# Patient Record
Sex: FEMALE | Race: WHITE
[De-identification: names, ages, dates, MRNs, and addresses within clinical notes are randomized per-mention and may not be internally consistent; named-entity substitution may affect disease eponyms.]

---

## 2018-04-26 ENCOUNTER — HOSPITAL ENCOUNTER (OUTPATIENT)
Dept: HOSPITAL 35 - RAD | Age: 83
End: 2018-04-26
Attending: FAMILY MEDICINE
Payer: COMMERCIAL

## 2018-04-26 DIAGNOSIS — Z12.31: Primary | ICD-10-CM

## 2020-09-30 NOTE — NUR
PT ARRIVED TO FLOOR PER WC FROM ED AT 1545 IN STABLE CONDITION.ADMISSION HX,
ASSESSMENT AND CARE PLAN COMPLETED.DR CAMPOS NOTIFIED ABOUT PT ARRIVAL TO
FLOOR AND ORDER NOTED.PT UP TO BSC WITH ASSIST.CLEAR LIQ TRAY GIVEN AT DINNER.
REPORT OFF TO NOC RN.

## 2020-09-30 NOTE — EKG
[facility]
[place]   [address]                     ELECTROCARDIOGRAM REPORT      
_______________________________________________________________________________
 
Name:       SHAHAB GARCIA                 Room #:         170-8       ADM [facility] 
M.R.#:      [id number]                       Account #:      [id number]  
Admission:  20    Attend Phys:    Robert Soliman MD  
Discharge:              Date of Birth:  [birth year]  
                                                          Report #: [id number]
                                                                    [id number]
_______________________________________________________________________________
THIS REPORT FOR:  
 
cc:  Robert Soliman MD, Neal A. MD Santiago, Patrick MD FACC                                         ~
THIS REPORT FOR:   //name//                          
 
                         [facility] ED
                                       
Test Date:    2020               Test Time:    12:34:47
Pat Name:     SHAHAB GARCIA              Department:   
Patient ID:   [id number]            Room:         [facility]
Gender:       F                        Technician:   ORLY
:          1933               Requested By: Debbie Love
Order Number: [id number] MD:   Mike Coon
                                 Measurements
Intervals                              Axis          
Rate:         67                       P:            44
WY:           211                      QRS:          -53
QRSD:         125                      T:            43
QT:           428                                    
QTc:          452                                    
                           Interpretive Statements
Sinus rhythm
RBBB and LAFB
No previous ECG available for comparison
Electronically Signed On 2020 15:19:12 CDT by Mike Coon
[email or web address]
 
 
 
 
 
 
 
 
 
 
 
 
 
 
 
 
  <ELECTRONICALLY SIGNED>
   By: Mike Coon MD, FACC    
  20     1519
D: 20 1234                           _____________________________________
T: 20 1234                           Mike Coon MD, [facility]      /EPI

## 2020-10-01 NOTE — NUR
Assumed pt care at 1900. Pt A/OX4,VSS though with low grade temp/SOA
 contacted and orders obtained for APAP/CXR/Blood cx. Xray results
called to MD and N.O for Levaquin given and implemented. Pt's IV infiltrated
twice and a 24 gauge started on LFA,IVF infusing w/o problems at this time. Pt
c/o not being able to sleep well despite sleep aid being given stating she
wants to go home today regardless of dtr/MD decision stating she shouldn't
have been admitted she's fine 1:1 reassuarance given to pt with little
success. Reports feeling hungry this am,on a clear liquid diet will relay to
day nurse for advancing diet. No c/o N/V throughout the night. Up with AX1 to
BSC. Fall precautions in place,calls approp for help,will continue to monitor
pt.

## 2020-10-01 NOTE — NUR
Assumed pt care at 7am.Assessment completed.vss.Pt was very anxious and
hungry wanted breakfast.Dr Soliman rounded on pt and order noted.Covid 19
test done.Lab called and was told that the result will be out by 1500.Due to
covid r/o protocol,pt transfered to 3west per bed at am accompanied by this
rn.Dr Soliman and pt family notified.

## 2020-10-01 NOTE — NUR
ASSUMED PATIENT CARE THIS AM AT APPROXIMATELY 1000. PATIENT ARRIVED TO UNIT
FROM 4W FOR R/O COVID. PATIENT EDUCATED ON ISOLATION PRECAUTIONS AND
INDICATION FOR MOVING ROOMS. ALL SAFETY PRECAUTIONS IN PLACE. CALL LIGHT
WITHIN REACH
EDUCATED PATIENT ON USE OF CALL LIGHT FOR ASSISTANCE OUT OF BED. NO ACUTE
DISTRESS NOTED. ASSESSMENT AND MEDS AS CHARTED.

## 2020-10-02 NOTE — NUR
0230 PT CALLED OUT TO USE BSC.  PT WAS LOOKING DISTRESSED AND HAVING HARD TIME
BREATHING.  PLACED 02 SENSOR AND PT WAS IN LOW 80'S. ORDERED ABG'S.  GRABBED
NC AND PLACED PT ON 4L.  02 UP TO 86-88%.  PLACED PT ON REBREATHER TO INCREASE
02 SATURATION.  PT 02 ON REBREATHER WAS 98%.  ALSO PLACED PT ON TELE TO SEE IF
ANY UNDERLYING HEART RHYTHMS.  TELE SHOWED ST.  CALLED  AND GAVE UPDATE ON
PT ABG'S, 02 NEEDS AND ANXIETY.  RECEIVED ORDERS AND TO VERIFY LABS AND
CHEST XRAY FOR AM.  PT GIVEN 0.5MG XANAX TO HELP WITH ANXIETY.  PT TOLERATED
XANAX WELL AND SLEEPING.  02 TITRATED TO 2L NC AND AT 98% AT 0430.  2ND COVID
SWAB CAME BACK POSITIVE. CALLED HS WITH RESULT AND PLACED CONSULT FOR ID.

## 2020-10-02 NOTE — NUR
PT CARE ASSUME AT 0700, PT ALERT AND ORIENTED X4, FORGETFUL ATIMES. PT IS ON
2L OF OXYGEN. SEEMS TO HAVE OW APPETITE, ENCOURAGE TO EAT BUT REFUSES TO EAT.
PT IS UP WITH 1 ASSIST TO THE BEDSIDE COMMODE. FALL PRECAUTION IN PLACE. WILL
CONTINUE TO MONITOR.

## 2020-10-02 NOTE — NUR
INITIAL ASSESSMENT:
Received consult. SW reviewed chart and spoke with nursing and attending
physician. Pt was admitted from home due to weakness/dehydration. Pt
transferred to 3W from 4W yesterday morning and placed in Enhanced Isolation.
Pt did have positive COVID test. Pt is on 2L of O2 and on IV abx/IV steroids.
Pt did require 15L with NRB mask overnight. Pt is a DNR. PT/OT ordered to
evaluate pt for discharge needs. ID consulted. No weekend discharge planned.
BINTA placed call to pt's room. No answer. BINTA spoke with pt's dtr, Milagros, via
phone. Introduced role of SW. Pt is normally alert/orientated x 4. Pt lives at
home with Milagros and her . 5 steps to enter the home. 10 steps up to
the second level where pt's bedroom is located. Pt does not use any DME and is
able to navigate the stairs. No use of O2 prior to admission. Pt's PCP is Dr. Soliman. No hx of HH services or post-acute placement. Pt's dtr is concerned
about pt returning home and need to isolate in the home. BINTA discussed options
for discharge: SNF placement v. Home with HH. Pt's dtr states HH would be
preferred. SW answered all questions from pt's dtr. SW is following to assist
as needed with discharge planning.

## 2020-10-03 NOTE — NUR
ASSUMED PATIENT CARE THIS AM AT APPROXIMATELY 0700. PATIENT AWAKE ALERT
ORIENTED X3. INTERMITTENT CONFUSION ON DATE/TIME. PATIENT O2 SAT AT 90% ON
4LNC, INCREASED O2 TO 5LNC AND O2 SAT INCREASED TO 92%. WILL CTM. PATIENT
DENIES ANY SOB AT THIS TIME. NO COMPLAINTS OF PAIN. STATES THAT SHE WOULD LIKE
TO AMBULATE IN ROOM BY HERSELF BUT EDUCATED ON FALL PRECAUTIONS WITH MX LINES.
ENCOURAGED OOB TO CHAIR WITH ASSISTANCE ONLY.

## 2020-10-03 NOTE — NUR
1 st dose of Remdesivir given and convelscent plasma started at 0155. spoke to
pts dtr for consent to administer te plasma. also talked with pt/ educated on
the need and documents information provided. she has poor vision, helped her
read through the education.

## 2020-10-04 NOTE — NUR
ASSUMED PATIENT CARE THIS AM AT APPROXIMATELY 0700. PATIENT AWAKE ALERT
ORIENTED X3. DISORIENTED TO TIME. PATIENT FORGETFUL AND REPEATS SAME
QUESTIONS. REORIENTED PATIEN PRN THROUGHOUT SHIFT. ASSISTED PATIENT TO BSC AND
BEDSIDE CHAIR WITH 1 PERSON ASSIST, TOLERATED WELL DENIES ANY DIZZINESS/ SOB
WITH TRANSFERS. ENCOUARAGED PATIEN TO STAY IN CHAIR FOR MAJORITY OF THE DAY.
PATIENT O2 SAT AT 90% ON 4LNC. TOLERATING MEDICATIONS WELL. BED ALARM AND
CHAIR ALARM IN PLACE. EDUCATED PATIENT ON FALL PRECAUTIONS.

## 2020-10-04 NOTE — NUR
CONTINUES ON THE REMDESIVIR, SHE STATD THAT SHE IS FEELING BETTER AND LESS
WEAK. CONTINUES ON IV FLUIDS. CALLS APPROP FOR ASSIST OUT FOR BSC.
REPEATEDLY ASKING WHEN SHE WILL GET TO GO HOME. FORGETFUL ABOUT ANSWER GIVEN.

## 2020-10-05 NOTE — NUR
Pt. on 5L/NC at beginning of shift with O2 sat in the low 90's.Assisted
to use commode the desat in the mid 80's. O2 titrated up to 7L/HF to keep O2
sat >90%. She does get short of breath with exertion. RT notified and gave her
scheduled breathing tx. RT titrated O2 back down to 6L after tx. She requested
for anxiety med and xanax given. She slept fair during the night. Cont. on
enhanced precaution ,afebrile. Bed alarm on for safety.

## 2020-10-05 NOTE — NUR
BINTA reviewed chart and spoke with nursing. Pt remains in Enhanced Isolation due
to COVID-19. Pt had convalescent plasma and is completing course of
Remdesivir. Pt is afebrile and now requiring 15L of O2. Pt is on IV lasix and
IV steroids. BINTA spoke with pt's dtr, Milagros, via phone. Milagros asked
about pt being put on hospice in the hospital, should her condition continue
to decline. Pt's dtr to coordinate with pt's nurse another Face Time visit, as
visitors are not allowed on 3W. BINTA updated pt's nurse. BINTA is following to
assist as needed with discharge planning.

## 2020-10-05 NOTE — NUR
ASSUMED PATIENT CARE THIS AM. PATIENT IS AWAKE AND ALERT, CONFUSED AND
FORGETFUL. PATIENT VERY ANXIOUS THIS AM, REPEATEDLY ASKING TO GET OUT OF BED
TO BSC, O2 SAT DROPPED TO 80% ON 7LNC THIS AM WHEN GETTING UP TO BSC, IT TOOK
ABOUT 5 MINUTES FOR PATIENT O2 TO RECOVER TO 88%. PATIENT O2 INCREASED TO
HIGHFLOR NC AT 9L. O2 SAT 91%. ENCOURAGED PATIENT TO USE BEDSIDE COMMODE
AND O2 SAT DROPPED WHEN USING BEDSIDE COMMODE AS WELL TO 80%, PLACED PATIENT
ON NRB AT 15L AND ORDERS OBTAINED FROM DR. CAMPOS FOR SEALS PLACEMENT, CHEST
XRAY, LASIX IV, ABG. O2 SAT INCREASED TO 95% ON 15LNRB. RESTING IN BED.
CALLED PATIENT DAUGHTER TO MAKE AWARE OF CHANGE IN
PATIENT STATUS AND WHEN PATIENT WAS SETTLED PATIENT WAS ABLE TO SPEAK WITH
DAUGHTER VIA FACETIME VIDEO CHAT. ALL SAFETY MEASURES IN PLACE. CALLED DR CAMPOS TO MAKE AWARE OF CHEST XRAY AND ABG RESULTS. AWAITING NEW ORDERS

## 2020-10-06 NOTE — NUR
PT WAS VERY ANXIOUS AT BEGINNING OF SHIFT, CALLING OUT FREQUENTLY AND MAKING
CONFUSED STATEMENTS, SAYING SHE WANTS TO GO HOME.  PT'S DTR JUSTEN CALLED
UNIT; UPDATE PROVIDED. PT BECOMES SOA WITH ANY EXERTION AND O2 SATS DROP INTO
THE 80S. RT PLACED PT ON OPTIFLOW 50L, 75% FIO2. PT WAS GIVEN BENADRYL AND
TYLENOL AT BEDTIME TO HELP WITH SLEEP AND DISCOMFORT. PT HAS SINCE BEEN
SLEEPING WELL DURING THE NIGHT.  RESPIRATIONS EVEN AND UNLABORED. PT HAS
MAINTAINED SPO2 > 92% WHILE ON OPTIFLOW. FALL PRECAUTIONS IN PLACE. NOT
PROGRESSING WELL TOWARD POC GOALS.

## 2020-10-06 NOTE — NUR
BINTA reviewed chart and spoke with nursing and attending physician. Pt remains
in Enhanced Isolation due to COVID-19. Pt is febrile and requiring optiflow
O2. Pt is on IV abx and IV steroids. Pt to complete course of Remdesivir
today. Per attending, pt's family would like to continue course of treatment
for the next couple of days to see if pt's condition improves. If not, pt
would be agreeable with comfort care/hospice. Pt's family has been able to
see/communicate with pt via Face Time. BINTA is following to assist as needed
with discharge planning.

## 2020-10-06 NOTE — NUR
PT VERY ANXIOUS...RESPONDS WELL TO ATIVAN PRN,...SHE DID FACETIME HER DAUGHTER
TODAY WHICH DID CALM HER DOWN...

## 2020-10-07 NOTE — NUR
PT KEEPS REQUESTING TO GO HOME...VEWRY CONCERNED ABOUT SEEING HER FAMILY...SHE
FACETIMED FAMILY X 3 TODAY... Declined

## 2020-10-07 NOTE — NUR
SW reviewed chart and spoke with nursing and attending physician. Pt  remains
in Enhanced Isolation due to COVID-19. Pt is afebrile and and requiring
Optiflow. Pt is on IV Lasix, IV steroids and IV abx. Pt has completed course
of Remdesivir. Physical therapy was able to work with therapy yesterday.  BINTA
is following to assist as needed with discharge planning.

## 2020-10-07 NOTE — NUR
ASSUMED PT CARE AROUND 1930. VSS. REMAINS TO BE ON HFC.MAINTAINED BY RT MOST
OF THE TIMES AN ON CONT PULSE OX MONITORING. NO S/S ACUTE DISTRESS NOTED OR
REPORTED AT THIS TIME. CARE TRANSFERRED TO ANOTHER RN AT THIS TIME

## 2020-10-08 NOTE — NUR
progress
 
pt alert denies pain except for throat pain ice chips provided and tylenol
given with effect pt slept most of shift up to bsc for small stool. tran
intact draining clear yellow urine remians on optiflo at 53 liters and a fio2
of 79 cont, sat monitor in place reading remaining within normal limits 91 to
95%. accuchecks and ssi continue. iv abt's administered as ordered continue
poc.

## 2020-10-08 NOTE — NUR
ASSUMED PATIENT CARE THIS AM AT APPROXIMATELY 0700. PATIENT IS AWAKE ALERT
ORIENTED X3. PATIENT O2 SAT 94-96% ON OPTIFLOW 53L AT 79% FIO2. RT TITRATING
O2 NEEDS THIS SHIFT, ABLE TO TITRATE PATIENT DOWN TO 45L AT 45% FIO2 THIS
SHIFT. O2 SAT 91-93% AT REST. PATIENT WAS ABLE TO GO TO BEDSIDE CHAIR X 2HRS
THIS SHIFT WITH OT. O2 SAT DID DROP TO 86% WITH OOB ACTIVITY. TOLERATING PO
MEDS, POOR APPETITE THIS SHIFT ONLY DRINKING ENSURES FOR MEALS, REFUSING SOLID
FOOD.

## 2020-10-09 NOTE — NUR
CONT ON HIGH MANDY OXYGEN AT THIS TIME. RESPIRATIONS ARE NON LABORED. PLEASANT
WITH CARE. DOES NOT SEEM TO BE IN PAIN. HAD MULTIPLE SMALL STOOLS THROUGH THE
DAY. SPOKE WITH FAMILY VIA VIDEO. WILL CONT WITH PLAN OF CARE.

## 2020-10-09 NOTE — NUR
BINTA reviewed chart and spoke with nursing and attending physician. Pt remains
in Enhanced Isolation due to COVID-19. Pt is afebrile and on optiflow. Pt is
on IV abx and IV steroids. No weekend discharge planned. BINTA spoke with pt's
dtr and son-in-law via phone. Update provided and questions answered. BINTA
discussed post-acute placement: LTAC/inpt acute rehab/SNF. Pt's family
verbalized understanding and know that pt most likely cannot return directly
home when discharged. BINTA is following to assist as needed with discharge
planning.

## 2020-10-09 NOTE — NUR
PT HAS SCOWL ON FACE EYES CLOSED TALKING ON PHONE WITH  STATING SHE
WANTS TO GO HOME TOMORROW, PT ALSO REQUESTING COMMUNION. NURSE TALKED WITH
 ON PHONE, AND HE STATED PT IS NORMALLY VERY POSITIVE AND HAPPY. PT
CONTINUES WITH OPTI MANDY AND CONTINUOUS PULSE OX.
, LUNGS COARSE IN THE BASES. SEALS TO DD. IVF CONTINUE. PT COMPLIANT WITH HS
MEDS. BED ALARM ON. PT REPORTED TO HAVE POOR PO INTAKE TODAY, DECLINED HS
SNACK.

## 2020-10-09 NOTE — NUR
PATIENT SLEEPING IN BED AWAKEN FOR ASSESSMENT. IS ALERT X 2-3. SKIN WARM AND
DRY. RESP EVEN ANDF UNLABORED. 02 IS N45 L WITH 45 FI02. O2 SAT 96%. HAS A
RIGHT HAND SL FLUSHES WELL WITH ALL ANTIBIOTICS. HAS A POOR VISON. HAS A POOR
APPETITE AT TIMES. SEALS INTACT. UP TO BSC WITH NO BM NOTED. NO SKIN ISSUES
NOTED. TAKES MEDS WHOLE WITH WATER. IS A 1 PERSON MOD ASSIST. FALL RISK. HAS
EDEMAA NOTED TO ANKLES ABOUT 1+. LUNGS HAVE CRACKLES NOTED IN RIGHT SIDE.,MHAS
REDDNESS ON HIS SHINS BILATERAL. GIVEN ATIVAN FOR ANXIETY. SLEEPING WELL NOW.
CONT PLAN OF CARE.

## 2020-10-10 NOTE — NUR
PATIENT HAS BEEN UP FROM BED FEW TIMES TODAY TO USE BEDSIDE COMMODE. HAS HAD
ONE BOWEL MOVEMENT SO FAR. SEALS DRAINING CLEAR YELLOW URINE. WILL CONT WITH
PLAN OF CARE.

## 2020-10-11 NOTE — NUR
ASSUMED PATIENT CARE THIS AM AT APPROXIMATELY 0700. PATIENT IS AWAKE ALERT AND
ORIENTED TO SELF PLACE AND SITUATION. WITH SOME INTERMITTENT FORGETFULNESS AND
CONFUSION. PATIENT VERY ANXIOUS THIS AM AND ATTEMPTING TO GET OUT OF BED
FREQUENTLY WITHOUT CALLING FOR ASSISTANCE. ASSISTED PATIENT TO BEDSIDE CHAIR
FOR BREAKFAST AND LUNCH AND IMMEDIATELY CALLED TO BE ASSISTED BACK TO BED.
PATIENT GIVEN ATIVAN X1 THIS AM FOR ANXIETY. WILL CTM FOR EFFECTIVENESS. ALL
SAFETY MEASURES IN PLACE.O2 SAT 90-92% ON OPTIFLOW NC.

## 2020-10-11 NOTE — NUR
PT IS CONFUSED AND FORGETFUL. VSS AFEBRILE. SATS 94-95% WITH CURRENT OPTIFLOW
SETTINGS. PT C/O "I CANT BREATHE." RT TX GIVEN EARLIER. ATIVAN GIVEN FOR
ANXIETY. PT BREATHING BETTER NOW. RESTING QUIETLY. SATS 95%. IV IBX INFUSING.
BED DOWN. CALL LIGHT IN REACH SIDE RAILS UP X2. BED ALARM ON. JONAS CONTINUE TO
MONITOR PT FOR CHANGES.

## 2020-10-12 NOTE — NUR
PT PROGRESSED WELL THROUGHOUT THE SHIFT. PT STARTED ON VAPOTHERM AT 45L 33%.
PT IS NOW ON HIGH FLOW NASAL CANNULA @ 10L. TOLERATING WELL. PT PARTICIPATED
WITH PT/OT TODAY. DID NOT DESATURATE WITH ACTIVITY. GOOD PO INTAKE AND OUTPUT.

## 2020-10-12 NOTE — NUR
CORRECTION FROM PREVIOUS NURSING NOTE ENTRY. PTS BP HAS BEEN MODERATELY
ELEVATED FROM 150s to as high as 180s. Dr Soliman was notified and he stated
he would add or adjust bp medications.

## 2020-10-12 NOTE — NUR
PT RESTING QUIETLY PRESENTLY. IV ABX INFUSING. UNLABORED PRESENTLY ON
OPTIFLOW. NO CHANGES IN ASSESSMENT.

## 2020-10-12 NOTE — NUR
PT IS ALERT AND ORIENTED X1. CONFUSED. THOUGHT SHE WAS AT [facility] IN HER
NEW ROOM. PT IS DISORIENTED AT TIMES TO DAY AND NIGHT. BED ALARM ON SIDE RAILS
UP X3. BED DOWN IN LOW LOCKED POSITION. VSS AFEBRILE. DESATS WHILE GETTING UP
TO BSC BRIEFLY THEN BACK UP 93%. ABX GIVEN AS ORDERED. WILL CONTINUE TO
MONITOR PT FOR CHANGES. PT VOIDS CLEAR YELLOW URINE IN BSC, PT HAD 1 LOOSE
STOOL.

## 2020-10-12 NOTE — NUR
PT IS ABLE TO STATE HER NAME, WHAT STATE SHE IS IN, HER BIRTHDAY AND THE
MONTH. UNABLE TO STATE WHAT KIND OF BUILDING SHE IS IN, BUT KNOWS THAT SHE
WANTS TO GET OUT AND SHE WANTS TO GO BACK TO [place] WERE IT IS WARM. 
CALLED THE PT WHEN I WAS IN THE ROOM. PT KEPT TELLING HIM THAT SHE WANTS TO GO
WITH HIM AND GO BACK TO [place]. I TALKED WITH  ON THE PHONE. HE STATES
THAT LIFE WAS DIFFERENT WHEN THEY LIVED IN [place] AND PT ISN'T USED TO BEING
ISOLATED. STATED MY APOLOGIES ABOUT ISOLATION GUIDELINES
AND UPDATED HIM ON PTS CARE

## 2020-10-12 NOTE — NUR
BINTA reviewed chart and spoke with nursing and attending physician. Pt remains
in Enhanced Isolation due to COVID-19. Pt is afebrile and on optiflow (40L 33%
FiO2). Pt is s/p convalescent plasma and course of Remdesivir. Pt is on IV abx
and IV steroids.  Repeat COVID test to be ordered. BINTA spoke with pt's dtr,
Milagros, via phone to discuss LTAC placement. Lengthy discussion regarding
the three LTAC facilities. Pt's dtr requests referral to be sent to all three
for review. Pt would prefer a private room. SW faxed referrals with Rev Codes
to the three LTACS for review. Notified liaisons. BINTA is following to assist as
needed with discharge planning.

## 2020-10-13 NOTE — NUR
PT PROGRESSING TOWARDS D/C GOALS. TOLERATED GETIING TO BSC WELL ON 10 L NC.
NEW IV PLACED L HAND FOR ABX. LUNGS DIMINSHED. LABORED SLIGHTLY WHILE GETTING
TO BSC. NO C/O PAIN. PT ANXIOUS TO GO TO BACK TO [place]. WILL CONTINUE TO
MONITOR PT FOR CHANGES.

## 2020-10-13 NOTE — NUR
BINTA reviewed chart and spoke with nursing and attending physician. Pt remains
in Enhanced Isolation due to COVID-19. Pt had repeat test yesterday and it was
negative. Pt is afebrile and on 8L of O2. Pt is on IV abx and IV steroids.
Attending states discharge to SNF is anticipated for tomorrow to [facility]. BINTA spoke with pt's dtr, Milagros, via phone to provide update
and discuss discharge plan. BINTA discussed that the LTACs have pt's info. Pt's
dtr states that Dr. Solmian discussed [facility], and she is agreeable
with referral to [facility].   BINTA faxed SNF referral to [facility] and notified
liaison.  [facility] requests a second negative COVID test if possible. [facility]
can provide up to 7L of O2. BINTA updated attending physician. BINTA is following to
assist as needed with discharge planning.

## 2020-10-13 NOTE — NUR
ASSUMED CARE APPROX 0700.  PT ALERT AND ORIENTED X3. ASSESSMENT AS CHARTED AND
VSS.  REMAINS ON HFNC @8L AND DESATS WHEN AMBULATING. PT'S DTR AND SON UPDATED
ON STATUS THIS SHIFT. PT DENIES PAIN. PT SLOWLY PROGRESSING TOWARDS PLAN OF
CARE GOALS.

## 2020-10-14 NOTE — NUR
Pt. very anxious at beginning of shift. Daughter requested facetime with pt.
O2 at 6L/HF with O2 sat in the mid 90's when awake then low 90's while asleep.
She gets tachypneic after using the commode but recovers. Pt. encouraged to
relax to catch her breathing as she gets anxious. She requested sleep med ,
benadryl given. She slept well during the night. Cont. on enhanced precaution.
She has been afebrile. Had bm this am. Making some progress towards care plan
goals.

## 2020-10-14 NOTE — NUR
ASSUMED CARE OF PT AT 0700. PT ALERT AND ORIENTED TIMES THREE. VSS, 97%6L. PT
DENIES PAIN. CONTINUES TO GET SOA ON EXERTION. PT TOLERATES MEDS AND MEALS. PT
UP TO BSC WITH ASSIST OF ONE. PLANS TO DISCHARGE TO NURSING HOME TODAY.
WILL CONTINUE TO MONITOR.

## 2020-10-14 NOTE — NUR
BINTA reviewed chart and spoke with nursing and attending physician. Pt is
medically stable for discharge to [facility] today. Pt is
afebrile and on 5L of O2. BINTA discussed case with [facility] liaison, who
states they are able to accept pt. They do not need another negative test. SW
notified that [facility] is unable to accept pt today due to having a
positive test within the facility. [facility] may be able to accept pt
tomorrow or pending results of additional tests.  BINTA spoke with pt's dtr,
Milagros, via phone to provide update. Pt's dtr is aware and in agreement
with plan. SW updated LTAC liaisons of pt going to SNF. BINTA is following to
assist as needed with discharge planning.

## 2020-10-15 NOTE — NUR
TALKED WITH DAUGHTER JUSTEN. INFORMED HER THAT PT LEFT HOSPITAL. INFORMED
HER OF NO VISITING AT THE REHAB FACILITY FOR 2 WEEKS AND THEN TO SET UP
APPOINTMENT TO VISIT FOLLOWING THEN. PHONE NUMBER OF [facility] GONE OVER
WITH DAUGHTER.

## 2020-10-15 NOTE — NUR
Pt. requested sleep med last night , benadryl given and stated she slept well
during the night. Maintaining O2 sat in the mid 90's at rest on 3L/NC then low
90's with activities. She does get short of breath with exertion and gets
anxious as well. Up with SBA to commode to void. Making progress towards care
plan goals.

## 2021-11-27 NOTE — EKG
[place] [address] [place]  [address]
Phone:  [phone]                    ELECTROCARDIOGRAM REPORT      
_______________________________________________________________________________
 
Name:       SHAHAB GARCIA                 Room #:                     [facility]#:      [id number]     Account #:      [id number]  
Admission:  21    Attend Phys:                          
Discharge:  21    Date of Birth:  [birth year]  
                                                          Report #: [id number]
   [id number]
_______________________________________________________________________________
                         [facility] ED
                                       
Test Date:    2021               Test Time:    10:42:30
Pat Name:     SHAHAB GARCIA              Department:   
Patient ID:   [id number]            Room:          
Gender:       F                        Technician:   MARYURI
:          1933               Requested By: Debbie Love
Order Number: [id number] MD:   Edu Anthony
                                 Measurements
Intervals                              Axis          
Rate:         76                       P:            -60
MO:           211                      QRS:          -54
QRSD:         122                      T:            11
QT:           403                                    
QTc:          454                                    
                           Interpretive Statements
Sinus rhythm
RBBB and LAFB
Compared to ECG 2020 12:34:47
Ectopic atrial rhythm now present
Sinus rhythm no longer present
Electronically Signed On 2021 10:01:51 CST by Edu Anthony
[email or web address]
 
 
 
 
 
 
 
 
 
 
 
 
 
 
 
 
 
 
 
 
  <ELECTRONICALLY SIGNED>
   By: Edu Anthony MD               
  21     1001
D: 21 1042                           _____________________________________
T: 21 1042                           Edu Anthony MD                 /CHARISSA